# Patient Record
Sex: MALE | Race: BLACK OR AFRICAN AMERICAN | Employment: OTHER | URBAN - METROPOLITAN AREA
[De-identification: names, ages, dates, MRNs, and addresses within clinical notes are randomized per-mention and may not be internally consistent; named-entity substitution may affect disease eponyms.]

---

## 2017-07-25 ENCOUNTER — HOSPITAL ENCOUNTER (EMERGENCY)
Age: 52
Discharge: HOME | End: 2017-07-25
Attending: EMERGENCY MEDICINE
Payer: SELF-PAY

## 2017-07-25 VITALS
HEIGHT: 71 IN | BODY MASS INDEX: 26.6 KG/M2 | DIASTOLIC BLOOD PRESSURE: 93 MMHG | WEIGHT: 190 LBS | RESPIRATION RATE: 18 BRPM | TEMPERATURE: 97.7 F | SYSTOLIC BLOOD PRESSURE: 164 MMHG | HEART RATE: 84 BPM | OXYGEN SATURATION: 99 %

## 2017-07-25 DIAGNOSIS — L25.9 CONTACT DERMATITIS, UNSPECIFIED CONTACT DERMATITIS TYPE, UNSPECIFIED TRIGGER: Primary | ICD-10-CM

## 2017-07-25 PROCEDURE — 99282 EMERGENCY DEPT VISIT SF MDM: CPT

## 2017-07-25 RX ORDER — TRIAMCINOLONE ACETONIDE 1 MG/G
CREAM TOPICAL 2 TIMES DAILY
Qty: 15 G | Refills: 0 | Status: SHIPPED | OUTPATIENT
Start: 2017-07-25 | End: 2017-12-04 | Stop reason: ALTCHOICE

## 2017-07-25 RX ORDER — DIPHENHYDRAMINE HCL 25 MG
50 CAPSULE ORAL
Qty: 20 CAP | Refills: 0 | Status: SHIPPED | OUTPATIENT
Start: 2017-07-25

## 2017-07-25 NOTE — ED NOTES
Pt in with c/o rash. 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 Rash noted to R side of neck.

## 2017-07-25 NOTE — DISCHARGE INSTRUCTIONS
Dermatitis: Care Instructions  Your Care Instructions  Dermatitis is the general name used for any rash or inflammation of the skin. Different kinds of dermatitis cause different kinds of rashes. Common causes of a rash include new medicines, plants (such as poison oak or poison ivy), heat, and stress. Certain illnesses can also cause a rash. An allergic reaction to something that touches your skin, such as latex, nickel, or poison ivy, is called contact dermatitis. Contact dermatitis may also be caused by something that irritates the skin, such as bleach, a chemical, or soap. These types of rashes cannot be spread from person to person. How long your rash will last depends on what caused it. Rashes may last a few days or months. Follow-up care is a key part of your treatment and safety. Be sure to make and go to all appointments, and call your doctor if you are having problems. It's also a good idea to know your test results and keep a list of the medicines you take. How can you care for yourself at home? · Do not scratch the rash. Cut your nails short, and file them smooth. Or wear gloves if this helps keep you from scratching. · Wash the area with water only. Pat dry. · Put cold, wet cloths on the rash to reduce itching. · Keep cool, and stay out of the sun. · Leave the rash open to the air as much as possible. · If the rash itches, use hydrocortisone cream. Follow the directions on the label. Calamine lotion may help for plant rashes. · Take an over-the-counter antihistamine, such as diphenhydramine (Benadryl) or loratadine (Claritin), to help calm the itching. Read and follow all instructions on the label. · If your doctor prescribed a cream, use it as directed. If your doctor prescribed medicine, take it exactly as directed. When should you call for help?   Call your doctor now or seek immediate medical care if:  · You have symptoms of infection, such as:  ¨ Increased pain, swelling, warmth, or redness. ¨ Red streaks leading from the area. ¨ Pus draining from the area. ¨ A fever. · You have joint pain along with the rash. Watch closely for changes in your health, and be sure to contact your doctor if:  · Your rash is changing or getting worse. · You are not getting better as expected. Where can you learn more? Go to http://indu-sunil.info/. Enter ([phone] in the search box to learn more about \"Dermatitis: Care Instructions. \"  Current as of: October 13, 2016  Content Version: 11.3  © 8037-0254 [employer]. Care instructions adapted under license by [employer] (which disclaims liability or warranty for this information). If you have questions about a medical condition or this instruction, always ask your healthcare professional. [address] any warranty or liability for your use of this information.

## 2017-07-25 NOTE — ED PROVIDER NOTES
Patient is a 49 y.o. male presenting with rash. The history is provided by the patient. Rash    This is a new problem. Episode onset: Pt states he was taking down plaster for an old building. Shortly after he noticed rash to right neck and arms. (+) itching. Denies pain, drainage, swelling, fever/chills. There has been no fever. The rash is present on the neck and left arm. The patient is experiencing no pain. Associated symptoms include itching. Pertinent negatives include no blisters, no pain, no weeping and no hives. He has tried nothing for the symptoms. No past medical history on file. Past Surgical History:   Procedure Laterality Date    HX ORTHOPAEDIC Bilateral     leg         No family history on file. Social History     Social History    Marital status: SINGLE     Spouse name: N/A    Number of children: N/A    Years of education: N/A     Occupational History    Not on file. Social History Main Topics    Smoking status: Not on file    Smokeless tobacco: Not on file    Alcohol use Not on file    Drug use: Not on file    Sexual activity: Not on file     Other Topics Concern    Not on file     Social History Narrative    No narrative on file         ALLERGIES: Review of patient's allergies indicates no known allergies. Review of Systems   Constitutional: Negative for chills and fever. HENT: Negative for facial swelling. Eyes: Negative for photophobia and visual disturbance. Respiratory: Negative for shortness of breath. Cardiovascular: Negative for chest pain. Gastrointestinal: Negative for abdominal pain, nausea and vomiting. Genitourinary: Negative for flank pain. Musculoskeletal: Negative for back pain and myalgias. Skin: Positive for itching and rash. Negative for color change, pallor and wound. Neurological: Negative for dizziness, weakness, light-headedness and headaches. All other systems reviewed and are negative.       Vitals:    07/25/17 1722 BP: (!) 164/93   Pulse: 84   Resp: 18   Temp: 97.7 °F (36.5 °C)   SpO2: 99%   Weight: 86.2 kg (190 lb)   Height: 5' 11\" (1.803 m)            Physical Exam   Constitutional: He is oriented to person, place, and time. He appears well-developed and well-nourished. No distress. HENT:   Head: Normocephalic and atraumatic. Eyes: Conjunctivae are normal.   Cardiovascular: Normal rate, regular rhythm and normal heart sounds. Pulmonary/Chest: Effort normal and breath sounds normal. No respiratory distress. Musculoskeletal: Normal range of motion. Neurological: He is alert and oriented to person, place, and time. No cranial nerve deficit. Skin: Skin is warm. Rash noted. No erythema. Erythematous papular patches of skin to right neck. No excoriations, ulcers, drainage. Psychiatric: He has a normal mood and affect. His behavior is normal.   Nursing note and vitals reviewed. MDM  Number of Diagnoses or Management Options  Contact dermatitis, unspecified contact dermatitis type, unspecified trigger:   Diagnosis management comments: DDx: Contact Dermatitis, Poison Ivy, Scabies, Bed Bugs    ED Course       Procedures        LABORATORY TESTS:  No results found for this or any previous visit (from the past 12 hour(s)). IMAGING RESULTS:  No orders to display       MEDICATIONS GIVEN:  Medications - No data to display    IMPRESSION:  1. Contact dermatitis, unspecified contact dermatitis type, unspecified trigger        PLAN:  1. Current Discharge Medication List      START taking these medications    Details   triamcinolone acetonide (KENALOG) 0.1 % topical cream Apply  to affected area two (2) times a day. use thin layer  Qty: 15 g, Refills: 0      diphenhydrAMINE (BENADRYL) 25 mg capsule Take 2 Caps by mouth every six (6) hours as needed. Qty: 20 Cap, Refills: 0           2.    Follow-up Information     Follow up With Details Comments Contact Info    [facility] Schedule an appointment as soon as possible for a visit in 1 day for PCP follow up [address] [address] [facility]  [phone]        Return to ED if worse

## 2017-07-25 NOTE — ED NOTES
Emergency Department Nursing Plan of Care       The Nursing Plan of Care is developed from the Nursing assessment and Emergency Department Attending provider initial evaluation. The plan of care may be reviewed in the ED Provider note.     The Plan of Care was developed with the following considerations:   Patient / Family readiness to learn indicated by:verbalized understanding  Persons(s) to be included in education: patient  Barriers to Learning/Limitations:No    Signed     Zuleima Mason RN    7/25/2017   6:09 PM

## 2017-12-04 ENCOUNTER — OFFICE VISIT (OUTPATIENT)
Dept: FAMILY MEDICINE CLINIC | Age: 52
End: 2017-12-04

## 2017-12-04 VITALS
OXYGEN SATURATION: 99 % | TEMPERATURE: 96.4 F | HEART RATE: 71 BPM | HEIGHT: 70 IN | DIASTOLIC BLOOD PRESSURE: 82 MMHG | RESPIRATION RATE: 16 BRPM | BODY MASS INDEX: 27.97 KG/M2 | WEIGHT: 195.4 LBS | SYSTOLIC BLOOD PRESSURE: 134 MMHG

## 2017-12-04 DIAGNOSIS — Z00.00 WELL ADULT EXAM: Primary | ICD-10-CM

## 2017-12-04 DIAGNOSIS — Z86.19 HISTORY OF HEPATITIS B: ICD-10-CM

## 2017-12-04 DIAGNOSIS — Z12.11 COLON CANCER SCREENING: ICD-10-CM

## 2017-12-04 DIAGNOSIS — E78.2 MIXED HYPERLIPIDEMIA: ICD-10-CM

## 2017-12-04 LAB
BILIRUB UR QL STRIP: NEGATIVE
GLUCOSE UR-MCNC: NEGATIVE MG/DL
KETONES P FAST UR STRIP-MCNC: NEGATIVE MG/DL
PH UR STRIP: 5.5 [PH] (ref 4.6–8)
PROT UR QL STRIP: NEGATIVE
SP GR UR STRIP: 1.01 (ref 1–1.03)
UA UROBILINOGEN AMB POC: NORMAL (ref 0.2–1)
URINALYSIS CLARITY POC: CLEAR
URINALYSIS COLOR POC: YELLOW
URINE BLOOD POC: NEGATIVE
URINE LEUKOCYTES POC: NEGATIVE
URINE NITRITES POC: NEGATIVE

## 2017-12-04 RX ORDER — BISMUTH SUBSALICYLATE 262 MG
1 TABLET,CHEWABLE ORAL DAILY
COMMUNITY

## 2017-12-04 RX ORDER — ASPIRIN 325 MG
325 TABLET ORAL DAILY
COMMUNITY

## 2017-12-04 NOTE — MR AVS SNAPSHOT
Visit Information Date & Time Provider Department Dept. Phone Encounter #  
 12/4/2017  2:00 PM Clem Jackson MD [facility] at [address] [id number] Follow-up Instructions Return in about 1 year (around 12/4/2018), or if symptoms worsen or fail to improve. Allergies as of 12/4/2017  Review Complete On: 12/4/2017 By: Clem Jackson MD  
 No Known Allergies Current Immunizations  Never Reviewed No immunizations on file. Not reviewed this visit You Were Diagnosed With   
  
 Codes Comments Well adult exam    -  Primary ICD-10-CM: Z00.00 ICD-9-CM: V70.0 Mixed hyperlipidemia     ICD-10-CM: E78.2 ICD-9-CM: 272.2 Colon cancer screening     ICD-10-CM: Z12.11 ICD-9-CM: V76.51 History of hepatitis B     ICD-10-CM: Z86.19 ICD-9-CM: V12.09 Vitals BP Pulse Temp Resp Height(growth percentile) Weight(growth percentile) 134/82 (BP 1 Location: Right arm, BP Patient Position: Sitting) 71 96.4 °F (35.8 °C) (Oral) 16 5' 9.5\" (1.765 m) 195 lb 6.4 oz (88.6 kg) SpO2 BMI Smoking Status 99% 28.44 kg/m2 Never Smoker Vitals History BMI and BSA Data Body Mass Index Body Surface Area  
 28.44 kg/m 2 2.08 m 2 Your Updated Medication List  
  
   
This list is accurate as of: 12/4/17  2:52 PM.  Always use your most recent med list.  
  
  
  
  
 aspirin 325 mg tablet Commonly known as:  ASPIRIN Take 325 mg by mouth daily. diphenhydrAMINE 25 mg capsule Commonly known as:  BENADRYL Take 2 Caps by mouth every six (6) hours as needed. fish oil-omega-3 fatty acids 340-1,000 mg capsule Take 1 Cap by mouth daily. multivitamin tablet Commonly known as:  ONE A DAY Take 1 Tab by mouth daily. We Performed the Following AMB POC URINALYSIS DIP STICK AUTO W/O MICRO [69652 CPT(R)] CBC W/O DIFF [79240 CPT(R)] CHLAMYDIA/GC PCR [79966 CPT(R)] HEMOGLOBIN A1C WITH EAG [72661 CPT(R)] HIV 1/2 AG/AB, 4TH GENERATION,W RFLX CONFIRM [id number] CPT(R)] LIPID PANEL [92898 CPT(R)] METABOLIC PANEL, COMPREHENSIVE [89243 CPT(R)] PSA, DIAGNOSTIC (PROSTATE SPECIFIC AG) [id number] CPT(R)] REFERRAL TO GASTROENTEROLOGY [TXZ26 Custom] RPR [37248 CPT(R)] TSH 3RD GENERATION [50255 CPT(R)] VITAMIN D, 25 HYDROXY [id number] CPT(R)] Follow-up Instructions Return in about 1 year (around 12/4/2018), or if symptoms worsen or fail to improve. Referral Information Referral ID Referred By Referred To  
  
 [id number] [facility] [address] [facility] [address] [place], [address] [facility] Visits Status Start Date End Date 1 New Request 12/4/17 12/4/18 If your referral has a status of pending review or denied, additional information will be sent to support the outcome of this decision. Introducing [facility]! Sis Zambrano introduces Location patient portal. Now you can access parts of your medical record, email your doctor's office, and request medication refills online. 1. In your internet browser, go to [email or web address] 2. Click on the First Time User? Click Here link in the Sign In box. You will see the New Member Sign Up page. 3. Enter your Location Access Code exactly as it appears below. You will not need to use this code after youve completed the sign-up process. If you do not sign up before the expiration date, you must request a new code. · Location Access Code: [email or web address] Expires: 3/4/2018  2:01 PM 
 
4. Enter the last four digits of your Social Security Number (xxxx) and Date of Birth (mm/dd/yyyy) as indicated and click Submit. You will be taken to the next sign-up page. 5. Create a Location ID. This will be your Location login ID and cannot be changed, so think of one that is secure and easy to remember. 6. Create a Datto password. You can change your password at any time. 7. Enter your Password Reset Question and Answer. This can be used at a later time if you forget your password. 8. Enter your e-mail address. You will receive e-mail notification when new information is available in [address] 9. Click Sign Up. You can now view and download portions of your medical record. 10. Click the Download Summary menu link to download a portable copy of your medical information. If you have questions, please visit the Frequently Asked Questions section of the [employer] website. Remember, Datto is NOT to be used for urgent needs. For medical emergencies, dial 911. Now available from your iPhone and Android! Please provide this summary of care documentation to your next provider. Your primary care clinician is listed as Dayana Dubon. If you have any questions after today's visit, please call [phone].

## 2017-12-04 NOTE — PROGRESS NOTES
Subjective:     Chief Complaint   Patient presents with   [address] Patient        He  is a 47 y.o. male who presents for evaluation of:  New pt to [facility] care. Doing well today with no concerns. Colonoscopy - No  Exercising - does a lot push ups  Dieting - on and off  Smoking - No    Has Hepatitis B - prev treated by Dr. Yadi Nye. Last seen at [facility] about 6 yrs ago. Has an appt on 12/7/17. Had viral levels undetectable but never was able to f/u to see sustained viral response. Hx of IV drug use. 17 years since using. ROS:  Constitutional: negative for fevers, chills and fatigue  Eyes: negative for visual disturbance  Ears, nose, mouth, throat, and face: negative for hearing loss and sore throat  Respiratory: negative for cough or dyspnea on exertion  Cardiovascular: negative for chest pain, dyspnea, palpitations, fatigue  Gastrointestinal: negative for nausea, vomiting, change in bowel habits, diarrhea and abdominal pain  Genitourinary:negative for frequency and dysuria, Nocturia 1-2 x per night d/t heavy fluid intake  Integument/breast: negative for rash and skin lesion(s)  Hematologic/lymphatic: negative for easy bruising and bleeding  Musculoskeletal:negative for myalgias and muscle weakness  Neurological: negative for headaches and dizziness  Behavioral/Psych: negative for anxiety and depression  RITESH screening  1. Snoring - Do you snore loudly (louder than talking or loud enough to be heard through closed doors)? Maybe  2. Tired - Do you often feel tired, fatigued, or sleepy during daytime? N  3. Observed - Has anyone observed you stop breathing during your sleep? N  4. Blood pressure - Do you have or are you being treated for high blood pressure? N  5. BMI - BMI more than 35 kg/m2? N  10. Age - Age over 48 yr old? Y  7. Neck circumference - Neck circumference greater than 40 cm? N  8. Gender - Gender male?  Y    High risk of RITESH: >5  Mod risk of RITESH: 3-5  Low risk of RITESH: <3 Objective:     Vitals:    12/04/17 1357   BP: 134/82   Pulse: 71   Resp: 16   Temp: 96.4 °F (35.8 °C)   TempSrc: Oral   SpO2: 99%   Weight: 195 lb 6.4 oz (88.6 kg)   Height: 5' 9.5\" (1.765 m)     Physical Examination:  General appearance - alert, well appearing, and in no distress  Eyes - sclera anicteric  Nose - normal and patent, no erythema, discharge or polyps  Mouth - mucous membranes moist, pharynx normal without lesions  Neck - supple, no significant adenopathy  Lymphatics - no palpable lymphadenopathy, no hepatosplenomegaly  Chest - clear to auscultation, no wheezes, rales or rhonchi, symmetric air entry  Heart - normal rate, regular rhythm, normal S1, S2, no murmurs, rubs, clicks or gallops  Abdomen - soft, nontender, nondistended, no masses or organomegaly   - normal size prostate  Neurological - alert, oriented, normal speech, no focal findings or movement disorder noted  Musculoskeletal - no joint tenderness, deformity or swelling  Extremities - no edema  Skin - no rashes or suspicious lesions    History reviewed. No pertinent past medical history. Past Surgical History:   Procedure Laterality Date    HX ORTHOPAEDIC Bilateral     leg     Current Outpatient Prescriptions on File Prior to Visit   Medication Sig Dispense Refill    diphenhydrAMINE (BENADRYL) 25 mg capsule Take 2 Caps by mouth every six (6) hours as needed. 20 Cap 0     No current facility-administered medications on file prior to visit. Assessment/ Plan:   Diagnoses and all orders for this visit:    1. Well adult exam  -     CBC W/O DIFF  -     HEMOGLOBIN A1C WITH EAG  -     LIPID PANEL  -     METABOLIC PANEL, COMPREHENSIVE  -     TSH 3RD GENERATION  -     PROSTATE SPECIFIC AG  -     AMB POC URINALYSIS DIP STICK AUTO W/O MICRO  -     VITAMIN D, 25 HYDROXY  -     HIV 1/2 AG/AB, 4TH GENERATION,W RFLX CONFIRM  -     RPR  -     [facility]  -     CHLAMYDIA/GC PCR    2.  Mixed hyperlipidemia  -     HEMOGLOBIN A1C WITH EAG  - LIPID PANEL  -     METABOLIC PANEL, COMPREHENSIVE  -     TSH 3RD GENERATION    3. Colon cancer screening  -     Ramiro Lucas [facility]    4. History of hepatitis B - to see Hepatology this week. Asked pt to have notes sent to me if able. I have discussed the diagnosis with the patient and the intended plan as seen in the above orders. The patient has received an after-visit summary and questions were answered concerning future plans. I have discussed medication side effects and warnings with the patient as well. The patient verbalizes understanding and agreement with the plan. Follow-up Disposition:  Return in about 1 year (around 12/4/2018), or if symptoms worsen or fail to improve.

## 2017-12-04 NOTE — PROGRESS NOTES
Chief Complaint   Patient presents with   [address] [facility] Patient   1. Have you been to the ER, urgent care clinic since your last visit? Hospitalized since your last visit? Yes When: 7/2017 Rash    2. Have you seen or consulted any other health care providers outside of the [address] [place] [address] since your last visit? Include any pap smears or colon screening.  No   Would like physical  Room 9

## 2017-12-05 LAB
25(OH)D3+25(OH)D2 SERPL-MCNC: 27.7 NG/ML (ref 30–100)
ALBUMIN SERPL-MCNC: 4.6 G/DL (ref 3.5–5.5)
ALBUMIN/GLOB SERPL: 1.5 {RATIO} (ref 1.2–2.2)
ALP SERPL-CCNC: 41 IU/L (ref 39–117)
ALT SERPL-CCNC: 27 IU/L (ref 0–44)
AST SERPL-CCNC: 20 IU/L (ref 0–40)
BILIRUB SERPL-MCNC: 1.1 MG/DL (ref 0–1.2)
BUN SERPL-MCNC: 7 MG/DL (ref 6–24)
BUN/CREAT SERPL: 7 (ref 9–20)
CALCIUM SERPL-MCNC: 9.1 MG/DL (ref 8.7–10.2)
CHLORIDE SERPL-SCNC: 99 MMOL/L (ref 96–106)
CHOLEST SERPL-MCNC: 228 MG/DL (ref 100–199)
CO2 SERPL-SCNC: 26 MMOL/L (ref 18–29)
CREAT SERPL-MCNC: 1.01 MG/DL (ref 0.76–1.27)
ERYTHROCYTE [DISTWIDTH] IN BLOOD BY AUTOMATED COUNT: 15.9 % (ref 12.3–15.4)
EST. AVERAGE GLUCOSE BLD GHB EST-MCNC: 123 MG/DL
GFR SERPLBLD CREATININE-BSD FMLA CKD-EPI: 86 ML/MIN/1.73
GFR SERPLBLD CREATININE-BSD FMLA CKD-EPI: 99 ML/MIN/1.73
GLOBULIN SER CALC-MCNC: 3.1 G/DL (ref 1.5–4.5)
GLUCOSE SERPL-MCNC: 91 MG/DL (ref 65–99)
HBA1C MFR BLD: 5.9 % (ref 4.8–5.6)
HCT VFR BLD AUTO: 41.4 % (ref 37.5–51)
HDLC SERPL-MCNC: 36 MG/DL
HGB BLD-MCNC: 13.7 G/DL (ref 13–17.7)
HIV 1+2 AB+HIV1 P24 AG SERPL QL IA: NON REACTIVE
LDLC SERPL CALC-MCNC: 171 MG/DL (ref 0–99)
MCH RBC QN AUTO: 26.5 PG (ref 26.6–33)
MCHC RBC AUTO-ENTMCNC: 33.1 G/DL (ref 31.5–35.7)
MCV RBC AUTO: 80 FL (ref 79–97)
PLATELET # BLD AUTO: 231 X10E3/UL (ref 150–379)
POTASSIUM SERPL-SCNC: 4 MMOL/L (ref 3.5–5.2)
PROT SERPL-MCNC: 7.7 G/DL (ref 6–8.5)
PSA SERPL-MCNC: 0.3 NG/ML (ref 0–4)
RBC # BLD AUTO: 5.17 X10E6/UL (ref 4.14–5.8)
RPR SER QL: NON REACTIVE
SODIUM SERPL-SCNC: 139 MMOL/L (ref 134–144)
TRIGL SERPL-MCNC: 104 MG/DL (ref 0–149)
TSH SERPL DL<=0.005 MIU/L-ACNC: 2.52 UIU/ML (ref 0.45–4.5)
VLDLC SERPL CALC-MCNC: 21 MG/DL (ref 5–40)
WBC # BLD AUTO: 5.7 X10E3/UL (ref 3.4–10.8)

## 2017-12-06 DIAGNOSIS — E78.2 MIXED HYPERLIPIDEMIA: Primary | ICD-10-CM

## 2017-12-06 DIAGNOSIS — Z86.19 HISTORY OF HEPATITIS B: ICD-10-CM

## 2017-12-06 DIAGNOSIS — R73.03 PREDIABETES: ICD-10-CM

## 2017-12-06 LAB
C TRACH RRNA SPEC QL NAA+PROBE: NEGATIVE
N GONORRHOEA RRNA SPEC QL NAA+PROBE: NEGATIVE